# Patient Record
Sex: FEMALE | Race: BLACK OR AFRICAN AMERICAN | NOT HISPANIC OR LATINO | URBAN - METROPOLITAN AREA
[De-identification: names, ages, dates, MRNs, and addresses within clinical notes are randomized per-mention and may not be internally consistent; named-entity substitution may affect disease eponyms.]

---

## 2022-08-19 ENCOUNTER — ROUTINE PRENATAL (OUTPATIENT)
Dept: FAMILY MEDICINE CLINIC | Facility: CLINIC | Age: 37
End: 2022-08-19

## 2022-08-19 VITALS
TEMPERATURE: 97.6 F | HEIGHT: 60 IN | HEART RATE: 94 BPM | DIASTOLIC BLOOD PRESSURE: 80 MMHG | SYSTOLIC BLOOD PRESSURE: 117 MMHG | BODY MASS INDEX: 23.84 KG/M2 | WEIGHT: 121.4 LBS

## 2022-08-19 DIAGNOSIS — O14.10 SEVERE PRE-ECLAMPSIA, ANTEPARTUM: ICD-10-CM

## 2022-08-19 PROCEDURE — 99214 OFFICE O/P EST MOD 30 MIN: CPT | Performed by: FAMILY MEDICINE

## 2022-08-19 NOTE — PROGRESS NOTES
Assessment/Plan:     section wound complication  - Pt s/p delivery via low transverse C section for severe range BP and fetal growth restriction   - Surgery was complicated by abdominal adhesion required lysis and reopening due to sponges being left behind  - Pt reported pain is adequately controlled with Tylenol and Oxycodone 5 mg   - She denied fever, purulent discharge from surgical site, non erythematous surrounding incision    - Uterus felt firm on exam  - Small vaginal spotting   Plan  - Redress patient's wound today with adhesion dressing    - Endorse pt to avoid heavy lifting and bending down to  thing  - Monitoring fever  Postpartum hypertension  - Pt reported her BP after  was in severe range  - Started on labetalol 500 mg t i d , Procardia XL 30 mg daily  - She denied headache, blurry vision, decreased urine output, chest pain   - BP in clinic today in nml range  Plan  - Continue with current regimen of labetalol 500 mg TID, Procardia xl 30 mg QD for now   - Follow up in 2 weeks for BP          Diagnoses and all orders for this visit:     section wound complication    Severe pre-eclampsia, antepartum          Subjective:      Patient ID: Nabila Saeed is a 31 y o  female  HPI    31 y o f  s/p delivery via C section on , hospital course complicated by postpartum hypertension  Pt reported that her Csection was complicated by significant adhesion which required lysis, a sponge was left behind after closure which prompted re-opening and re-closure  Postpartum, pt's BP was in severe range which initiated starting Labetalol and Procardia  Currently, she reported that her pain is tolerable with Oxycodone and Tylenol  She denied fever, n/v, blurry vision, decreased urine output  I redressed the wound in clinic today  Her BP is in nl range in clinic         The following portions of the patient's history were reviewed and updated as appropriate: allergies, current medications, past family history, past medical history, past social history, past surgical history and problem list     Review of Systems   Constitutional: Negative for chills and fever  HENT: Negative for ear pain and sore throat  Eyes: Negative for pain and visual disturbance  Respiratory: Negative for cough and shortness of breath  Cardiovascular: Negative for chest pain and palpitations  Gastrointestinal: Negative for abdominal pain and vomiting  Genitourinary: Negative for dysuria and hematuria  Musculoskeletal: Negative for arthralgias and back pain  Skin: Negative for color change and rash  Neurological: Negative for seizures and syncope  All other systems reviewed and are negative  Objective:      /80   Pulse 94   Temp 97 6 °F (36 4 °C)   Ht 5' 0 2" (1 529 m)   Wt 55 1 kg (121 lb 6 4 oz)   BMI 23 55 kg/m²          Physical Exam  Constitutional:       General: She is not in acute distress  Appearance: She is not toxic-appearing  HENT:      Head: Normocephalic and atraumatic  Right Ear: There is no impacted cerumen  Eyes:      General: No scleral icterus  Cardiovascular:      Heart sounds: Normal heart sounds  No murmur heard  Pulmonary:      Effort: No respiratory distress  Breath sounds: Normal breath sounds  No wheezing  Abdominal:      Tenderness: There is abdominal tenderness  There is no rebound  Comments: Low transverse  incision DCI, no discharge notice  No saturation through the strip  Musculoskeletal:         General: No swelling, tenderness, deformity or signs of injury  Skin:     General: Skin is warm and dry  Capillary Refill: Capillary refill takes less than 2 seconds  Coloration: Skin is not jaundiced  Neurological:      General: No focal deficit present  Mental Status: She is oriented to person, place, and time  Cranial Nerves: No cranial nerve deficit     Psychiatric:         Mood and Affect: Mood normal

## 2022-08-19 NOTE — ASSESSMENT & PLAN NOTE
- Pt reported her BP after  was in severe range  - Started on labetalol 500 mg t i d , Procardia XL 30 mg daily  - She denied headache, blurry vision, decreased urine output, chest pain   - BP in clinic today in nml range  Plan  - Continue with current regimen of labetalol 500 mg TID, Procardia xl 30 mg QD for now   - Follow up in 2 weeks for BP

## 2022-08-19 NOTE — ASSESSMENT & PLAN NOTE
- Pt s/p delivery via low transverse C section for severe range BP and fetal growth restriction   - Surgery was complicated by abdominal adhesion required lysis and reopening due to sponges being left behind  - Pt reported pain is adequately controlled with Tylenol and Oxycodone 5 mg   - She denied fever, purulent discharge from surgical site, non erythematous surrounding incision    - Uterus felt firm on exam  - Small vaginal spotting   Plan  - Redress patient's wound today with adhesion dressing    - Endorse pt to avoid heavy lifting and bending down to  thing  - Monitoring fever

## 2022-09-02 ENCOUNTER — OFFICE VISIT (OUTPATIENT)
Dept: FAMILY MEDICINE CLINIC | Facility: CLINIC | Age: 37
End: 2022-09-02

## 2022-09-02 VITALS
DIASTOLIC BLOOD PRESSURE: 100 MMHG | HEIGHT: 60 IN | BODY MASS INDEX: 22.85 KG/M2 | WEIGHT: 116.4 LBS | TEMPERATURE: 97.4 F | HEART RATE: 92 BPM | SYSTOLIC BLOOD PRESSURE: 120 MMHG | OXYGEN SATURATION: 98 % | RESPIRATION RATE: 18 BRPM

## 2022-09-02 DIAGNOSIS — R11.0 NAUSEA: ICD-10-CM

## 2022-09-02 PROCEDURE — 99213 OFFICE O/P EST LOW 20 MIN: CPT | Performed by: FAMILY MEDICINE

## 2022-09-02 RX ORDER — ONDANSETRON 8 MG/1
TABLET, ORALLY DISINTEGRATING ORAL
COMMUNITY
Start: 2022-05-14 | End: 2022-09-02 | Stop reason: SDUPTHER

## 2022-09-02 RX ORDER — ALBUTEROL SULFATE 90 UG/1
2 AEROSOL, METERED RESPIRATORY (INHALATION)
COMMUNITY
Start: 2022-06-06

## 2022-09-02 RX ORDER — ASCORBIC ACID, CHOLECALCIFEROL, .ALPHA.-TOCOPHEROL ACETATE, DL-, PYRIDOXINE, FOLIC ACID, CYANOCOBALAMIN, CALCIUM, FERROUS FUMARATE, MAGNESIUM, DOCONEXENT 85; 200; 10; 25; 1; 12; 140; 27; 45; 300 [IU]/1; [IU]/1; [IU]/1; [IU]/1; MG/1; UG/1; MG/1; MG/1; MG/1; MG/1
CAPSULE, GELATIN COATED ORAL
COMMUNITY

## 2022-09-02 RX ORDER — LABETALOL 100 MG/1
500 TABLET, FILM COATED ORAL
COMMUNITY
Start: 2022-08-16 | End: 2022-09-15

## 2022-09-02 RX ORDER — NIFEDIPINE 30 MG/1
1 TABLET, EXTENDED RELEASE ORAL DAILY
COMMUNITY
Start: 2022-08-02

## 2022-09-02 RX ORDER — PYRIDOXINE HCL (VITAMIN B6) 50 MG
50 TABLET ORAL 3 TIMES DAILY
COMMUNITY

## 2022-09-02 RX ORDER — ASPIRIN 81 MG/1
81 TABLET ORAL
COMMUNITY

## 2022-09-02 RX ORDER — ONDANSETRON 8 MG/1
8 TABLET, ORALLY DISINTEGRATING ORAL EVERY 8 HOURS PRN
Qty: 20 TABLET | Refills: 1 | Status: SHIPPED | OUTPATIENT
Start: 2022-09-02

## 2022-09-02 NOTE — ASSESSMENT & PLAN NOTE
- DBP in higher range today  - Per Labetalol at current dose of 500 mg BID  has left her feeling nausea after taking it  - She has no c/o of visual problem, abdominal pain, or decrease urine output   Plan  - Continue with Procardia 30 mg QD and Labetalol 500 mg QD  - Refilled Zofran for nausea   - Check BP daily   - ER precaution reviewed with pt  - Follow up in 1 week , with plan to possibly increase Procardia or decrease Labetalol

## 2022-09-02 NOTE — ASSESSMENT & PLAN NOTE
- Wound is dry and clean, there is small erythema at the wound edge likely due to tension   - She denied fever, discharge from wound site     Plan  - Continue with regular wound washing, can leave off bandage but make sure to follow with good hygiene before washing wound  - Continue with Tylenol for analgesics (limit of 3 g/day)

## 2022-09-02 NOTE — PROGRESS NOTES
Assessment/Plan:    Postpartum hypertension  - DBP in higher range today  - Per Labetalol at current dose of 500 mg BID  has left her feeling nausea after taking it  - She has no c/o of visual problem, abdominal pain, or decrease urine output   Plan  - Continue with Procardia 30 mg QD and Labetalol 500 mg QD  - Refilled Zofran for nausea   - Check BP daily   - ER precaution reviewed with pt  - Follow up in 1 week , with plan to possibly increase Procardia or decrease Labetalol        section wound complication  - Wound is dry and clean, there is small erythema at the wound edge likely due to tension   - She denied fever, discharge from wound site  Plan  - Continue with regular wound washing, can leave off bandage but make sure to follow with good hygiene before washing wound  - Continue with Tylenol for analgesics (limit of 3 g/day)         Diagnoses and all orders for this visit:    Postpartum hypertension     section wound complication    Nausea  -     ondansetron (ZOFRAN-ODT) 8 mg disintegrating tablet; Take 1 tablet (8 mg total) by mouth every 8 (eight) hours as needed for nausea or vomiting    Other orders  -     albuterol (PROVENTIL HFA,VENTOLIN HFA) 90 mcg/act inhaler; Inhale 2 puffs  -     aspirin (ECOTRIN LOW STRENGTH) 81 mg EC tablet; Take 81 mg by mouth (Patient not taking: Reported on 2022)  -     doxylamine (UNISOM) 25 MG tablet; Take by mouth (Patient not taking: Reported on 2022)  -     labetalol (NORMODYNE) 100 mg tablet; Take 500 mg by mouth  -     NIFEdipine (PROCARDIA XL) 30 mg 24 hr tablet; Take 1 tablet by mouth daily  -     Discontinue: ondansetron (ZOFRAN-ODT) 8 mg disintegrating tablet; TAKE 8MG BY MOUTH EVERY 12HRS AS NEEDED   -     Prenat w/o Y-EI-Ybaomnt-FA-DHA (PNV-DHA) 27-0 6-0 4-300 MG CAPS; Take by mouth  -     pyridoxine (VITAMIN B6) 50 mg tablet; Take 50 mg by mouth Three times a day          Subjective:      Patient ID: Brittany Mckeon is a 31 y  o  female  HPI  31 y o f status post partum vis  3 weeks ago, presented for wound check of her transverse  incision  Wound is healing nicely, there is small erythema at the wound edge bilaterally, likely due to tension  Pt endorsed Labetalol has caused her to have more n/v every time she took the medication  She denied fever, chill, headache, blurry vision, epigastric pain  BP in clinic today has elevated Diastolic pressure, pt reported that she didn't take her labetalol due to fear of feeling nauseous  The following portions of the patient's history were reviewed and updated as appropriate: allergies, current medications, past family history, past medical history, past social history, past surgical history and problem list     Review of Systems   Constitutional: Negative for chills and fever  HENT: Negative for ear pain and sore throat  Eyes: Negative for pain and visual disturbance  Respiratory: Negative for cough and shortness of breath  Cardiovascular: Negative for chest pain and palpitations  Gastrointestinal: Negative for blood in stool and vomiting  Genitourinary: Negative for dysuria and hematuria  Musculoskeletal: Negative for arthralgias and back pain  Skin: Negative for color change and rash  Neurological: Negative for seizures and syncope  Psychiatric/Behavioral: Negative for agitation, behavioral problems, confusion, decreased concentration, dysphoric mood, self-injury, sleep disturbance and suicidal ideas  The patient is not nervous/anxious  All other systems reviewed and are negative  Objective:      /100 (BP Location: Left arm, Patient Position: Sitting, Cuff Size: Standard)   Pulse 92   Temp (!) 97 4 °F (36 3 °C) (Temporal)   Resp 18   Ht 5' 0 2" (1 529 m)   Wt 52 8 kg (116 lb 6 4 oz)   SpO2 98%   BMI 22 58 kg/m²          Physical Exam  Constitutional:       General: She is not in acute distress       Appearance: Normal appearance  She is not toxic-appearing  HENT:      Head: Normocephalic and atraumatic  Right Ear: External ear normal       Left Ear: External ear normal       Nose: No congestion  Mouth/Throat:      Pharynx: No oropharyngeal exudate  Eyes:      General: No scleral icterus  Cardiovascular:      Rate and Rhythm: Normal rate and regular rhythm  Pulses: Normal pulses  Heart sounds: Normal heart sounds  No murmur heard  Pulmonary:      Effort: No respiratory distress  Breath sounds: Normal breath sounds  No stridor  No wheezing  Abdominal:      Palpations: Abdomen is soft  Tenderness: There is no abdominal tenderness  There is no right CVA tenderness, left CVA tenderness, guarding or rebound  Hernia: No hernia is present  Musculoskeletal:         General: No swelling, tenderness or deformity  Normal range of motion  Cervical back: No rigidity  Lymphadenopathy:      Cervical: No cervical adenopathy  Skin:     Capillary Refill: Capillary refill takes less than 2 seconds  Coloration: Skin is not jaundiced  Comments: Mild erythema at the wound edges    Neurological:      General: No focal deficit present  Mental Status: She is alert     Psychiatric:         Mood and Affect: Mood normal          Behavior: Behavior normal

## 2022-09-06 ENCOUNTER — TELEPHONE (OUTPATIENT)
Dept: FAMILY MEDICINE CLINIC | Facility: CLINIC | Age: 37
End: 2022-09-06

## 2022-10-25 ENCOUNTER — TELEPHONE (OUTPATIENT)
Dept: FAMILY MEDICINE CLINIC | Facility: CLINIC | Age: 37
End: 2022-10-25